# Patient Record
(demographics unavailable — no encounter records)

---

## 2025-05-27 NOTE — DISCUSSION/SUMMARY
[FreeTextEntry1] : sees derm sister just dx with colon cancer Has a pmd  had low vit d talked about that  she will continue to get her HT from on [facility]  I did disc topical estrogen gel for safer estrogen

## 2025-05-27 NOTE — HISTORY OF PRESENT ILLNESS
[Patient reported colonoscopy was normal] : Patient reported colonoscopy was normal [FreeTextEntry1] : 67 yo  she  does  [employer] [facility]  until 65 [employer] is now her on line  service for gyn, they also rx her avril have been good she gets them annually.  had a drxa yrs ago and it was normal  SHe does estrodiol 1mg daily and every other day progesterone and she admits to some YNOI [ColonoscopyDate] : 2022 [TextBox_[id number]] : her sister was just dx with colon cancer'